# Patient Record
Sex: FEMALE
[De-identification: names, ages, dates, MRNs, and addresses within clinical notes are randomized per-mention and may not be internally consistent; named-entity substitution may affect disease eponyms.]

---

## 2017-10-09 ENCOUNTER — RX ONLY (RX ONLY)
Age: 22
End: 2017-10-09

## 2017-10-09 ENCOUNTER — APPOINTMENT (OUTPATIENT)
Age: 22
Setting detail: DERMATOLOGY
End: 2017-10-09

## 2017-10-09 DIAGNOSIS — L70.0 ACNE VULGARIS: ICD-10-CM

## 2017-10-09 DIAGNOSIS — B07.8 OTHER VIRAL WARTS: ICD-10-CM

## 2017-10-09 PROCEDURE — OTHER LIQUID NITROGEN: OTHER

## 2017-10-09 PROCEDURE — OTHER ISOTRETINOIN INITIATION: OTHER

## 2017-10-09 PROCEDURE — 99212 OFFICE O/P EST SF 10 MIN: CPT | Mod: 25

## 2017-10-09 PROCEDURE — 17110 DESTRUCT B9 LESION 1-14: CPT

## 2017-10-09 PROCEDURE — OTHER PRESCRIPTION: OTHER

## 2017-10-09 RX ORDER — CLINDAMYCIN PHOSPHATE 10 MG/G
GEL TOPICAL APPLY TO AA BID
Qty: 1 | Refills: 2 | Status: ERX | COMMUNITY
Start: 2017-10-09

## 2017-10-09 RX ORDER — FLUCONAZOLE 150 MG/1
TABLET ORAL QWEEK
Qty: 2 | Refills: 2 | Status: ERX | COMMUNITY
Start: 2017-10-09

## 2017-10-09 RX ORDER — ERYTHROMYCIN 500 MG/1
TABLET, FILM COATED ORAL QD
Qty: 30 | Refills: 2 | Status: ERX | COMMUNITY
Start: 2017-10-09

## 2017-10-09 RX ORDER — BENZOYL PEROXIDE 10 G/100G
SUSPENSION TOPICAL TWICE DAILY
Qty: 1 | Refills: 2 | Status: ERX | COMMUNITY
Start: 2017-10-09

## 2017-10-09 ASSESSMENT — LOCATION SIMPLE DESCRIPTION DERM
LOCATION SIMPLE: CHEST
LOCATION SIMPLE: RIGHT UPPER BACK
LOCATION SIMPLE: RIGHT THIGH
LOCATION SIMPLE: LEFT FOREHEAD

## 2017-10-09 ASSESSMENT — LOCATION DETAILED DESCRIPTION DERM
LOCATION DETAILED: UPPER STERNUM
LOCATION DETAILED: RIGHT ANTERIOR PROXIMAL THIGH
LOCATION DETAILED: RIGHT SUPERIOR MEDIAL UPPER BACK
LOCATION DETAILED: LEFT MEDIAL FOREHEAD

## 2017-10-09 ASSESSMENT — LOCATION ZONE DERM
LOCATION ZONE: FACE
LOCATION ZONE: TRUNK
LOCATION ZONE: LEG

## 2017-10-09 NOTE — PROCEDURE: LIQUID NITROGEN
Detail Level: Detailed
Add 52 Modifier (Optional): no
Consent: The patient's consent was obtained including but not limited to risks of crusting, scabbing, blistering, scarring, darker or lighter pigmentary change, recurrence, incomplete removal and infection.
Medical Necessity Information: It is in your best interest to select a reason for this procedure from the list below. All of these items fulfill various CMS LCD requirements except the new and changing color options.
Post-Care Instructions: I reviewed with the patient in detail post-care instructions. Patient is to wear sunprotection, and avoid picking at any of the treated lesions. Pt may apply Vaseline to crusted or scabbing areas.
Medical Necessity Clause: This procedure was medically necessary because the lesions that were treated were:

## 2017-11-14 ENCOUNTER — APPOINTMENT (OUTPATIENT)
Age: 22
Setting detail: DERMATOLOGY
End: 2017-11-15

## 2017-11-14 DIAGNOSIS — B07.8 OTHER VIRAL WARTS: ICD-10-CM

## 2017-11-14 DIAGNOSIS — L70.0 ACNE VULGARIS: ICD-10-CM

## 2017-11-14 DIAGNOSIS — Z79.899 OTHER LONG TERM (CURRENT) DRUG THERAPY: ICD-10-CM

## 2017-11-14 PROCEDURE — 99213 OFFICE O/P EST LOW 20 MIN: CPT

## 2017-11-14 PROCEDURE — OTHER COUNSELING: OTHER

## 2017-11-14 PROCEDURE — OTHER PRESCRIPTION: OTHER

## 2017-11-14 PROCEDURE — OTHER ORDER TESTS: OTHER

## 2017-11-14 PROCEDURE — OTHER COUNSELING: ISOTRETINOIN: OTHER

## 2017-11-14 RX ORDER — ISOTRETINOIN 20 MG/1
CAPSULE, LIQUID FILLED ORAL QD
Qty: 30 | Refills: 0 | Status: ERX | COMMUNITY
Start: 2017-11-14

## 2017-11-14 ASSESSMENT — LOCATION SIMPLE DESCRIPTION DERM
LOCATION SIMPLE: LEFT CHEEK
LOCATION SIMPLE: RIGHT KNEE

## 2017-11-14 ASSESSMENT — LOCATION ZONE DERM
LOCATION ZONE: FACE
LOCATION ZONE: LEG

## 2017-11-14 ASSESSMENT — LOCATION DETAILED DESCRIPTION DERM
LOCATION DETAILED: RIGHT KNEE
LOCATION DETAILED: LEFT INFERIOR CENTRAL MALAR CHEEK
LOCATION DETAILED: LEFT CENTRAL MALAR CHEEK

## 2017-12-05 ENCOUNTER — RX ONLY (RX ONLY)
Age: 22
End: 2017-12-05

## 2017-12-05 RX ORDER — ISOTRETINOIN 20 MG/1
CAPSULE, LIQUID FILLED ORAL QD
Qty: 30 | Refills: 0 | Status: ERX

## 2018-01-08 ENCOUNTER — APPOINTMENT (OUTPATIENT)
Age: 23
Setting detail: DERMATOLOGY
End: 2018-01-08

## 2018-01-08 DIAGNOSIS — Z79.899 OTHER LONG TERM (CURRENT) DRUG THERAPY: ICD-10-CM

## 2018-01-08 DIAGNOSIS — L70.0 ACNE VULGARIS: ICD-10-CM

## 2018-01-08 PROCEDURE — OTHER COUNSELING: ISOTRETINOIN: OTHER

## 2018-01-08 PROCEDURE — 99213 OFFICE O/P EST LOW 20 MIN: CPT

## 2018-01-08 PROCEDURE — OTHER COUNSELING: OTHER

## 2018-01-08 PROCEDURE — OTHER PRESCRIPTION: OTHER

## 2018-01-08 PROCEDURE — OTHER ORDER TESTS: OTHER

## 2018-01-08 RX ORDER — ISOTRETINOIN 40 MG/1
CAPSULE, LIQUID FILLED ORAL QD
Qty: 30 | Refills: 0 | Status: ERX | COMMUNITY
Start: 2018-01-08

## 2018-04-16 ENCOUNTER — APPOINTMENT (OUTPATIENT)
Age: 23
Setting detail: DERMATOLOGY
End: 2018-04-16

## 2018-04-16 DIAGNOSIS — L70.0 ACNE VULGARIS: ICD-10-CM

## 2018-04-16 PROCEDURE — OTHER PRESCRIPTION: OTHER

## 2018-04-16 PROCEDURE — 99213 OFFICE O/P EST LOW 20 MIN: CPT

## 2018-04-16 RX ORDER — CLINDAMYCIN PHOSPHATE 10 MG/G
APPLY GEL TOPICAL BID
Qty: 1 | Refills: 6 | Status: ERX | COMMUNITY
Start: 2018-04-16

## 2018-04-16 RX ORDER — MINOCYCLINE HYDROCHLORIDE 100 MG/1
CAPSULE ORAL QD
Qty: 30 | Refills: 2 | Status: ERX | COMMUNITY
Start: 2018-04-16

## 2018-04-16 RX ORDER — BENZOYL PEROXIDE 100 MG/ML
APPLY LIQUID TOPICAL BID
Qty: 1 | Refills: 2 | Status: ERX

## 2020-08-19 ENCOUNTER — APPOINTMENT (OUTPATIENT)
Age: 25
Setting detail: DERMATOLOGY
End: 2020-08-19

## 2020-08-19 DIAGNOSIS — L65.9 NONSCARRING HAIR LOSS, UNSPECIFIED: ICD-10-CM

## 2020-08-19 PROCEDURE — OTHER ADDITIONAL NOTES: OTHER

## 2020-08-19 PROCEDURE — OTHER ORDER TESTS: OTHER

## 2020-08-19 PROCEDURE — OTHER PRESCRIPTION MEDICATION MANAGEMENT: OTHER

## 2020-08-19 PROCEDURE — OTHER REASSURANCE: OTHER

## 2020-08-19 PROCEDURE — 99213 OFFICE O/P EST LOW 20 MIN: CPT

## 2020-08-19 PROCEDURE — OTHER COUNSELING: OTHER

## 2020-08-19 PROCEDURE — OTHER MIPS QUALITY: OTHER

## 2020-08-19 ASSESSMENT — LOCATION ZONE DERM: LOCATION ZONE: SCALP

## 2020-08-19 ASSESSMENT — LOCATION DETAILED DESCRIPTION DERM
LOCATION DETAILED: RIGHT CENTRAL PARIETAL SCALP
LOCATION DETAILED: LEFT CENTRAL PARIETAL SCALP

## 2020-08-19 ASSESSMENT — LOCATION SIMPLE DESCRIPTION DERM: LOCATION SIMPLE: SCALP

## 2020-08-19 NOTE — PROCEDURE: ADDITIONAL NOTES
Detail Level: Simple
Additional Notes: Pt states she has decreased energy and her hair is coming out she is 1.5 years postpartum.

## 2020-08-19 NOTE — PROCEDURE: PRESCRIPTION MEDICATION MANAGEMENT
Render In Strict Bullet Format?: No
Initiate Treatment: Prenatal vitamin
Modify Regimen: Pt refused Clobetasol scalp solution
Detail Level: Zone